# Patient Record
Sex: FEMALE | Race: WHITE | URBAN - METROPOLITAN AREA
[De-identification: names, ages, dates, MRNs, and addresses within clinical notes are randomized per-mention and may not be internally consistent; named-entity substitution may affect disease eponyms.]

---

## 2017-02-22 ENCOUNTER — TELEPHONE (OUTPATIENT)
Dept: CARDIOLOGY | Facility: CLINIC | Age: 22
End: 2017-02-22

## 2017-02-22 NOTE — TELEPHONE ENCOUNTER
[facility] Call Center    Phone Message    Name of Caller: Emily    Phone Number: Other phone number:  [phone]    Best time to return call: any    May a detailed message be left on voicemail: yes    Relation to patient: Other Name: Emily  Relationship: [employer]  Is there legal documentation in chart to discuss information with this person: N/A    Reason for Call: Other: Abhayma is requesting a call back from Dr. [facility]endez[facility] clinic regarding forms she just faxed to the clinic today 02/22/2017. Emily states the forms are very time sensitive. Please advise     Action Taken: Message routed to:  [facility]: Cardiology p [id number]

## 2017-02-23 NOTE — TELEPHONE ENCOUNTER
No fax number listed on form to return. Left message for Yoli requesting call back with fax number.

## 2017-02-23 NOTE — TELEPHONE ENCOUNTER
Received fax from Yoli Mejia, Special Needs Coordinator for [employer]. 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 Left message returning Yoli's call. Will await return call for further instruction.